# Patient Record
Sex: FEMALE | Race: WHITE | NOT HISPANIC OR LATINO
[De-identification: names, ages, dates, MRNs, and addresses within clinical notes are randomized per-mention and may not be internally consistent; named-entity substitution may affect disease eponyms.]

---

## 2021-02-08 ENCOUNTER — APPOINTMENT (OUTPATIENT)
Dept: ENDOCRINOLOGY | Facility: CLINIC | Age: 66
End: 2021-02-08
Payer: MEDICARE

## 2021-02-08 VITALS
DIASTOLIC BLOOD PRESSURE: 74 MMHG | WEIGHT: 176 LBS | TEMPERATURE: 98.2 F | OXYGEN SATURATION: 99 % | SYSTOLIC BLOOD PRESSURE: 140 MMHG | HEART RATE: 71 BPM | BODY MASS INDEX: 32.8 KG/M2 | HEIGHT: 61.42 IN | RESPIRATION RATE: 16 BRPM

## 2021-02-08 DIAGNOSIS — Z00.00 ENCOUNTER FOR GENERAL ADULT MEDICAL EXAMINATION W/OUT ABNORMAL FINDINGS: ICD-10-CM

## 2021-02-08 LAB — GLUCOSE BLDC GLUCOMTR-MCNC: 210

## 2021-02-08 PROCEDURE — 36415 COLL VENOUS BLD VENIPUNCTURE: CPT

## 2021-02-08 PROCEDURE — 95250 CONT GLUC MNTR PHYS/QHP EQP: CPT

## 2021-02-08 PROCEDURE — 95251 CONT GLUC MNTR ANALYSIS I&R: CPT

## 2021-02-08 PROCEDURE — 99072 ADDL SUPL MATRL&STAF TM PHE: CPT

## 2021-02-08 PROCEDURE — 99205 OFFICE O/P NEW HI 60 MIN: CPT | Mod: 25

## 2021-02-08 RX ORDER — ESCITALOPRAM OXALATE 10 MG/1
10 TABLET, FILM COATED ORAL
Refills: 0 | Status: ACTIVE | COMMUNITY

## 2021-02-08 RX ORDER — OMEPRAZOLE 40 MG/1
40 CAPSULE, DELAYED RELEASE ORAL
Qty: 30 | Refills: 0 | Status: ACTIVE | COMMUNITY
Start: 2021-01-04

## 2021-02-08 NOTE — HISTORY OF PRESENT ILLNESS
[FreeTextEntry1] : HISTORY OF PRESENT ILLNESS. \par \par Ms. FELICIANO was diagnosed with Diabetes Mellitus Type 2 in 2016. She reports history HTN, dyslipidemia, hyperthyroidism, MNG and denies CAD,  known complications of retinopathy, nephropathy, or neuropathy. She is presently on Metformin  mg w/ dinner, Lipitor 10 mg (1/2 tab qd), Acard 75 mg (equivalent to Plavix), Tapazole 2.5 mg 5 days a week, Indapen Dr 1.5 mg qd, Lexapro 10 mg. She is an immigrant from [place], residing in the area exposed to [place] fallout. She recalls having an uptake and scan showing "cold nodules" and an FNA done about 10 years ago, which was presumably benign. \par Blood sugars are checked 4 times a day. \par Log: fbs-105-146, p/B- , ac L- , p/D- , HS-  \par Hypoglycemia frequency: none\par Fingerstick glucose in the office today is 210 mg/dL 2 hours after eating. \par Diet: not following ADA\par Exercise: none\par \par Lab review: none\par \par Last dilated eye - 01/21\par Last podiatry visit  - none\par Last cardiology evaluation - several years ago\par Last stress test - does not recall\par Last 2-D Echo - several years ago\par Last nephrology evaluation - none\par Last neurology evaluation-none\par

## 2021-02-08 NOTE — ASSESSMENT
[FreeTextEntry1] : Current approaches to diabetes management are discussed with the patient. \par Target ranges for blood sugar, blood pressure and cholesterol reviewed, and risk reduction strategies verified. \par Hypoglycemia precautions reviewed with the patient. \par Suggested extensive diabetes education program, including nutritional and diabetes teaching and evaluation. \par Proper dietary restrictions and exercise routines discussed. \par Glucometer/SMBG and log book charting discussed.\par - Becky PRO\par - labs today\par - will likely switch to metformin/DPP4 combination post labs\par - continue lipitor, monitor blood pressure , urine microalbumin\par - advised to obtain prior pathology report from [facility]\par - repeat thyroid US and likely reFNA\par - given the size of the nodule, hyperthyroidism, higher risk for malignancy, would consider a surgical intervention\par - continue Tapazole 2.5 mg 5 days a week, pending labs\par RTC 3 months.\par \par *** [phone] (daughter Perla)\par *** [phone] (cell)

## 2021-02-15 ENCOUNTER — OUTPATIENT (OUTPATIENT)
Dept: OUTPATIENT SERVICES | Facility: HOSPITAL | Age: 66
LOS: 1 days | End: 2021-02-15
Payer: COMMERCIAL

## 2021-02-15 ENCOUNTER — APPOINTMENT (OUTPATIENT)
Dept: ULTRASOUND IMAGING | Facility: CLINIC | Age: 66
End: 2021-02-15
Payer: MEDICARE

## 2021-02-15 ENCOUNTER — RESULT REVIEW (OUTPATIENT)
Age: 66
End: 2021-02-15

## 2021-02-15 DIAGNOSIS — E78.5 HYPERLIPIDEMIA, UNSPECIFIED: ICD-10-CM

## 2021-02-15 DIAGNOSIS — I10 ESSENTIAL (PRIMARY) HYPERTENSION: ICD-10-CM

## 2021-02-15 PROCEDURE — 76536 US EXAM OF HEAD AND NECK: CPT | Mod: 26

## 2021-02-15 PROCEDURE — 76536 US EXAM OF HEAD AND NECK: CPT

## 2021-02-17 ENCOUNTER — NON-APPOINTMENT (OUTPATIENT)
Age: 66
End: 2021-02-17

## 2021-02-22 ENCOUNTER — APPOINTMENT (OUTPATIENT)
Dept: ENDOCRINOLOGY | Facility: CLINIC | Age: 66
End: 2021-02-22
Payer: MEDICARE

## 2021-02-22 PROCEDURE — 99072 ADDL SUPL MATRL&STAF TM PHE: CPT

## 2021-02-22 PROCEDURE — G0108 DIAB MANAGE TRN  PER INDIV: CPT

## 2021-04-19 ENCOUNTER — APPOINTMENT (OUTPATIENT)
Dept: SURGERY | Facility: CLINIC | Age: 66
End: 2021-04-19
Payer: MEDICARE

## 2021-04-28 ENCOUNTER — APPOINTMENT (OUTPATIENT)
Dept: SURGERY | Facility: CLINIC | Age: 66
End: 2021-04-28
Payer: MEDICARE

## 2021-04-28 VITALS
HEIGHT: 61.42 IN | HEART RATE: 71 BPM | DIASTOLIC BLOOD PRESSURE: 90 MMHG | WEIGHT: 170 LBS | SYSTOLIC BLOOD PRESSURE: 155 MMHG | BODY MASS INDEX: 31.69 KG/M2

## 2021-04-28 DIAGNOSIS — E05.90 THYROTOXICOSIS, UNSPECIFIED W/OUT THYROTOXIC CRISIS OR STORM: ICD-10-CM

## 2021-04-28 DIAGNOSIS — E04.2 NONTOXIC MULTINODULAR GOITER: ICD-10-CM

## 2021-04-28 PROCEDURE — 99204 OFFICE O/P NEW MOD 45 MIN: CPT

## 2021-04-28 PROCEDURE — 99072 ADDL SUPL MATRL&STAF TM PHE: CPT

## 2021-04-28 NOTE — REASON FOR VISIT
[Initial Consultation] : an initial consultation for [Other: _____] : [unfilled] [Source: ______] : History obtained from [unfilled] [FreeTextEntry2] : a toxic multinodular goiter

## 2021-04-28 NOTE — PHYSICAL EXAM
[Midline] : located in midline position [Normal] : orientation to person, place, and time: normal [de-identified] : Extremities: HAUSER x 4.   Skin: No obvious skin lesions.   Voice: clear

## 2021-04-29 LAB
T3 SERPL-MCNC: 102 NG/DL
T4 FREE SERPL-MCNC: 1.1 NG/DL
T4 SERPL-MCNC: 6.9 UG/DL
THYROGLOB AB SERPL-ACNC: 2315 IU/ML
THYROPEROXIDASE AB SERPL IA-ACNC: 385 IU/ML
TSH SERPL-ACNC: 0.45 UIU/ML

## 2021-05-01 LAB — TSI ACT/NOR SER: <0.1 IU/L

## 2021-05-03 LAB — TSH RECEPTOR AB: <1.1 IU/L

## 2021-05-04 ENCOUNTER — OUTPATIENT (OUTPATIENT)
Dept: OUTPATIENT SERVICES | Facility: HOSPITAL | Age: 66
LOS: 1 days | End: 2021-05-04
Payer: COMMERCIAL

## 2021-05-04 ENCOUNTER — APPOINTMENT (OUTPATIENT)
Dept: CT IMAGING | Facility: CLINIC | Age: 66
End: 2021-05-04
Payer: MEDICARE

## 2021-05-04 DIAGNOSIS — E05.20 THYROTOXICOSIS WITH TOXIC MULTINODULAR GOITER WITHOUT THYROTOXIC CRISIS OR STORM: ICD-10-CM

## 2021-05-04 PROCEDURE — 82565 ASSAY OF CREATININE: CPT

## 2021-05-04 PROCEDURE — 70491 CT SOFT TISSUE NECK W/DYE: CPT | Mod: 26

## 2021-05-04 PROCEDURE — 70491 CT SOFT TISSUE NECK W/DYE: CPT

## 2021-05-10 ENCOUNTER — NON-APPOINTMENT (OUTPATIENT)
Age: 66
End: 2021-05-10

## 2021-05-12 ENCOUNTER — APPOINTMENT (OUTPATIENT)
Dept: SURGERY | Facility: CLINIC | Age: 66
End: 2021-05-12
Payer: MEDICARE

## 2021-05-12 PROCEDURE — 99214 OFFICE O/P EST MOD 30 MIN: CPT

## 2021-05-12 PROCEDURE — 99072 ADDL SUPL MATRL&STAF TM PHE: CPT

## 2021-05-12 NOTE — PHYSICAL EXAM
[Midline] : located in midline position [Normal] : orientation to person, place, and time: normal [de-identified] : Extremities: HAUSER x 4.   Skin: No obvious skin lesions.   Voice: clear

## 2021-05-13 ENCOUNTER — APPOINTMENT (OUTPATIENT)
Dept: ENDOCRINOLOGY | Facility: CLINIC | Age: 66
End: 2021-05-13
Payer: MEDICARE

## 2021-05-13 VITALS
SYSTOLIC BLOOD PRESSURE: 140 MMHG | RESPIRATION RATE: 16 BRPM | BODY MASS INDEX: 30.75 KG/M2 | HEART RATE: 64 BPM | WEIGHT: 165 LBS | TEMPERATURE: 97.8 F | OXYGEN SATURATION: 97 % | DIASTOLIC BLOOD PRESSURE: 60 MMHG | HEIGHT: 61.42 IN

## 2021-05-13 LAB — GLUCOSE BLDC GLUCOMTR-MCNC: 124

## 2021-05-13 PROCEDURE — 36415 COLL VENOUS BLD VENIPUNCTURE: CPT

## 2021-05-13 PROCEDURE — 99072 ADDL SUPL MATRL&STAF TM PHE: CPT

## 2021-05-13 PROCEDURE — 99214 OFFICE O/P EST MOD 30 MIN: CPT | Mod: 25

## 2021-05-13 PROCEDURE — 82962 GLUCOSE BLOOD TEST: CPT

## 2021-05-13 RX ORDER — METFORMIN ER 500 MG 500 MG/1
500 TABLET ORAL DAILY
Qty: 180 | Refills: 3 | Status: ACTIVE | COMMUNITY
Start: 2021-02-22 | End: 1900-01-01

## 2021-05-13 NOTE — HISTORY OF PRESENT ILLNESS
[FreeTextEntry1] : F/u for multiple medical issues\par \par *** May 13, 2021 ***\par \par feels fine.\par  on Metformin  mg w/ dinner, Lipitor 10 mg (1/2 tab qd), Acard 75 mg (equivalent to Plavix), Tapazole 2.5 mg qd, Indapen Dr 1.5 mg qd, Lexapro 10 mg, Valsarta 160 mg qd\par \par CT neck (5/4/21) + mass effect on trachea with the shift to the left. + tracheal compression in AP dimension, but overall is patent. + Substernal extension\par Thyr US (2/15/21)-  large MNG with the dominant RLP 3.8x2.0x2.2 (no calcs)\par saw Dr. Lewis- planned for total tx. Scheduled to see a CTX sx\par \par HISTORY OF PRESENT ILLNESS. \par \par Ms. FELICIANO was diagnosed with Diabetes Mellitus Type 2 in 2016. She reports history HTN, dyslipidemia, hyperthyroidism, MNG and denies CAD,  known complications of retinopathy, nephropathy, or neuropathy. She is presently on Metformin  mg w/ dinner, Lipitor 10 mg (1/2 tab qd), Acard 75 mg (equivalent to Plavix), Tapazole 2.5 mg 5 days a week, Indapen Dr 1.5 mg qd, Lexapro 10 mg. She is an immigrant from [place], residing in the area exposed to [place] fallout. She recalls having an uptake and scan showing "cold nodules" and an FNA done about 10 years ago, which was presumably benign. \par Blood sugars are checked 4 times a day. \par Log: fbs-105-146, p/B- , ac L- , p/D- , HS-  \par Hypoglycemia frequency: none\par Fingerstick glucose in the office today is 210 mg/dL 2 hours after eating. \par Diet: not following ADA\par Exercise: none\par \par Lab review: none\par \par Last dilated eye - 01/21\par Last podiatry visit  - none\par Last cardiology evaluation - several years ago\par Last stress test - does not recall\par Last 2-D Echo - several years ago\par Last nephrology evaluation - none\par Last neurology evaluation-none\par

## 2021-05-13 NOTE — REASON FOR VISIT
[Follow - Up] : a follow-up visit [Hyperthyroidism] : hyperthyroidism [DM Type 2] : DM Type 2 [Thyroid nodule/ MNG] : thyroid nodule/ MNG

## 2021-05-13 NOTE — ASSESSMENT
[FreeTextEntry1] : - labs today\par - cont metformin, might need to switch to metformin/DPP4 combination\par - continue lipitor, monitor blood pressure , urine microalbumin\par - needs am PAC/PRA\par - advised to obtain prior pathology report from [facility]\par - pt is planning to go to [place] for few months, for total tx upon return\par - continue Tapazole 2.5 mg qd\par \par *** [phone] (daughter Perla)\par *** [phone] (cell)

## 2021-05-14 LAB
25(OH)D3 SERPL-MCNC: 27.2 NG/ML
ALBUMIN SERPL ELPH-MCNC: 4.6 G/DL
ALP BLD-CCNC: 69 U/L
ALT SERPL-CCNC: 16 U/L
ANION GAP SERPL CALC-SCNC: 9 MMOL/L
AST SERPL-CCNC: 16 U/L
BILIRUB SERPL-MCNC: 0.2 MG/DL
BUN SERPL-MCNC: 12 MG/DL
CALCIUM SERPL-MCNC: 9.5 MG/DL
CHLORIDE SERPL-SCNC: 102 MMOL/L
CHOLEST SERPL-MCNC: 171 MG/DL
CO2 SERPL-SCNC: 30 MMOL/L
CREAT SERPL-MCNC: 0.66 MG/DL
CREAT SPEC-SCNC: 64 MG/DL
ESTIMATED AVERAGE GLUCOSE: 120 MG/DL
FOLATE SERPL-MCNC: 5.6 NG/ML
FRUCTOSAMINE SERPL-MCNC: 244 UMOL/L
GLUCOSE SERPL-MCNC: 98 MG/DL
HBA1C MFR BLD HPLC: 5.8 %
HDLC SERPL-MCNC: 62 MG/DL
LDLC SERPL CALC-MCNC: 88 MG/DL
MICROALBUMIN 24H UR DL<=1MG/L-MCNC: <1.2 MG/DL
MICROALBUMIN/CREAT 24H UR-RTO: NORMAL MG/G
NONHDLC SERPL-MCNC: 109 MG/DL
POTASSIUM SERPL-SCNC: 4.5 MMOL/L
PROT SERPL-MCNC: 7 G/DL
SODIUM SERPL-SCNC: 141 MMOL/L
TRIGL SERPL-MCNC: 107 MG/DL
VIT B12 SERPL-MCNC: 1630 PG/ML

## 2021-05-17 ENCOUNTER — APPOINTMENT (OUTPATIENT)
Dept: THORACIC SURGERY | Facility: CLINIC | Age: 66
End: 2021-05-17

## 2021-05-20 ENCOUNTER — NON-APPOINTMENT (OUTPATIENT)
Age: 66
End: 2021-05-20

## 2021-12-06 ENCOUNTER — APPOINTMENT (OUTPATIENT)
Dept: ULTRASOUND IMAGING | Facility: IMAGING CENTER | Age: 66
End: 2021-12-06
Payer: MEDICARE

## 2021-12-06 ENCOUNTER — APPOINTMENT (OUTPATIENT)
Dept: SURGERY | Facility: CLINIC | Age: 66
End: 2021-12-06
Payer: MEDICARE

## 2021-12-06 ENCOUNTER — OUTPATIENT (OUTPATIENT)
Dept: OUTPATIENT SERVICES | Facility: HOSPITAL | Age: 66
LOS: 1 days | End: 2021-12-06
Payer: COMMERCIAL

## 2021-12-06 DIAGNOSIS — E04.9 NONTOXIC GOITER, UNSPECIFIED: ICD-10-CM

## 2021-12-06 PROCEDURE — 76536 US EXAM OF HEAD AND NECK: CPT | Mod: 26

## 2021-12-06 PROCEDURE — 99213 OFFICE O/P EST LOW 20 MIN: CPT

## 2021-12-06 PROCEDURE — 76536 US EXAM OF HEAD AND NECK: CPT

## 2021-12-06 NOTE — PHYSICAL EXAM
[Midline] : located in midline position [Normal] : orientation to person, place, and time: normal [de-identified] : Extremities: HAUSER x 4.   Skin: No obvious skin lesions.   Voice: clear

## 2021-12-13 ENCOUNTER — NON-APPOINTMENT (OUTPATIENT)
Age: 66
End: 2021-12-13

## 2021-12-13 ENCOUNTER — APPOINTMENT (OUTPATIENT)
Dept: THORACIC SURGERY | Facility: CLINIC | Age: 66
End: 2021-12-13
Payer: MEDICARE

## 2021-12-13 VITALS
BODY MASS INDEX: 32.87 KG/M2 | HEART RATE: 71 BPM | HEIGHT: 61.42 IN | SYSTOLIC BLOOD PRESSURE: 128 MMHG | DIASTOLIC BLOOD PRESSURE: 79 MMHG | WEIGHT: 176.37 LBS | RESPIRATION RATE: 17 BRPM | OXYGEN SATURATION: 95 %

## 2021-12-13 PROCEDURE — 99204 OFFICE O/P NEW MOD 45 MIN: CPT

## 2021-12-13 RX ORDER — VALSARTAN 160 MG/1
160 TABLET, COATED ORAL
Refills: 0 | Status: ACTIVE | COMMUNITY

## 2021-12-13 RX ORDER — POTASSIUM CHLORIDE 750 MG/1
10 CAPSULE, EXTENDED RELEASE ORAL DAILY
Qty: 30 | Refills: 2 | Status: DISCONTINUED | COMMUNITY
Start: 2021-02-22 | End: 2021-12-13

## 2021-12-16 NOTE — PHYSICAL EXAM
[General Appearance - Alert] : alert [General Appearance - In No Acute Distress] : in no acute distress [Sclera] : the sclera and conjunctiva were normal [PERRL With Normal Accommodation] : pupils were equal in size, round, and reactive to light [Extraocular Movements] : extraocular movements were intact [Outer Ear] : the ears and nose were normal in appearance [Oropharynx] : the oropharynx was normal [Neck Appearance] : the appearance of the neck was normal [Neck Cervical Mass (___cm)] : no neck mass was observed [Jugular Venous Distention Increased] : there was no jugular-venous distention [Thyroid Diffuse Enlargement] : the thyroid was not enlarged [Thyroid Nodule] : there were no palpable thyroid nodules [Auscultation Breath Sounds / Voice Sounds] : lungs were clear to auscultation bilaterally [Examination Of The Chest] : the chest was normal in appearance [Chest Visual Inspection Thoracic Asymmetry] : no chest asymmetry [Diminished Respiratory Excursion] : normal chest expansion [2+] : left 2+ [No Abnormalities] : the abdominal aorta was not enlarged and no bruit was heard [Breast Appearance] : normal in appearance [Breast Palpation Mass] : no palpable masses [Bowel Sounds] : normal bowel sounds [Abdomen Soft] : soft [Abdomen Tenderness] : non-tender [Abdomen Mass (___ Cm)] : no abdominal mass palpated [Cervical Lymph Nodes Enlarged Posterior Bilaterally] : posterior cervical [Cervical Lymph Nodes Enlarged Anterior Bilaterally] : anterior cervical [Supraclavicular Lymph Nodes Enlarged Bilaterally] : supraclavicular [No CVA Tenderness] : no ~M costovertebral angle tenderness [No Spinal Tenderness] : no spinal tenderness [Abnormal Walk] : normal gait [Nail Clubbing] : no clubbing  or cyanosis of the fingernails [Musculoskeletal - Swelling] : no joint swelling seen [Motor Tone] : muscle strength and tone were normal [Skin Color & Pigmentation] : normal skin color and pigmentation [Skin Turgor] : normal skin turgor [] : no rash [Deep Tendon Reflexes (DTR)] : deep tendon reflexes were 2+ and symmetric [Sensation] : the sensory exam was normal to light touch and pinprick [No Focal Deficits] : no focal deficits [Oriented To Time, Place, And Person] : oriented to person, place, and time [Impaired Insight] : insight and judgment were intact [Affect] : the affect was normal [Right Carotid Bruit] : no bruit heard over the right carotid [Left Carotid Bruit] : no bruit heard over the left carotid [Right Femoral Bruit] : no bruit heard over the right femoral artery [Left Femoral Bruit] : no bruit heard over the left femoral artery [FreeTextEntry1] : Deferred

## 2021-12-16 NOTE — CONSULT LETTER
[Dear  ___] : Dear  [unfilled], [Consult Letter:] : I had the pleasure of evaluating your patient, [unfilled]. [( Thank you for referring [unfilled] for consultation for _____ )] : Thank you for referring [unfilled] for consultation for [unfilled] [Please see my note below.] : Please see my note below. [Consult Closing:] : Thank you very much for allowing me to participate in the care of this patient.  If you have any questions, please do not hesitate to contact me. [Sincerely,] : Sincerely, [FreeTextEntry2] : Dr. Don Lewis (Head and Neck/Ref) [FreeTextEntry3] : Rick Crespo MD \par Attending Surgeon \par Division of Thoracic Surgery \par , Cardiovascular and Thoracic Surgery \par [facility]\par \par

## 2021-12-16 NOTE — ASSESSMENT
[FreeTextEntry1] : Ms. VIRAL FELICIANO, 66 year old female, never smoker, w/ hx of HTN, HLD, DM, Hashimoto's thyroiditis, who referred by Dr. 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 \par \par I have reviewed the patient's medical records and diagnostic images at time of this office consultation and have made the following recommendation:\par 1. CT neck reviewed and explained to patient, I recommended a partial sternotomy for a standby surgery. Risks and benefits and alternatives explained to patient, all questions answered, patient agreed to proceed with surgery.\par 2. F/u with Dr. Don Lewis. \par \par I personally performed the services described in the documentation, reviewed the documentation recorded by the scribe in my presence and it accurately and completely records my words and actions.\par \par I, Sharita Musa NP, am scribing for and the presence of DEIDRA Peraza, the following sections HISTORY OF PRESENT ILLNESS, PAST MEDICAL/FAMILY/SOCIAL HISTORY; REVIEW OF SYSTEMS; VITAL SIGNS; PHYSICAL EXAM; DISPOSITION. \par

## 2021-12-16 NOTE — HISTORY OF PRESENT ILLNESS
[FreeTextEntry1] : Ms. VIRAL FELICIANO, 66 year old female, never smoker, w/ hx of HTN, HLD, DM, Hashimoto's thyroiditis, who referred by Dr. 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 \par \par CT neck soft tissue on 05/04/2021:\par - Global heterogeneous enlargement of the thyroid gland is again seen, right side worse than left, containing multiple heterogeneous nodules, some of which are calcified. The dominant nodule is seen on the right posteriorly and measures up to 5.1 cm in greatest long axis dimensions.\par - The right thyroid lobe measures 4.9 x 6.3 x 9.0 cm (AP x TRV x CC). The left thyroid lobe measures 4.6 x 2.9 x 9.2 cm (AP x TRV x CC). Enlargement of the thyroid isthmus is also appreciated. Mass effect upon the trachea is seen which is minimally shifted to the left. The trachea is also compressed in AP dimensions but remains patent. Substernal extension of thyroid tissue is seen on the left adjacent to the trachea. There is no evidence of vocal cord paralysis.\par \par Patient is here today for CT surgery consultation for a standby surgery. Patient denies shortness of breath, cough, chest pain, fever, chills, loss of appetite, weight loss, or hemoptysis.

## 2021-12-20 ENCOUNTER — APPOINTMENT (OUTPATIENT)
Dept: SURGERY | Facility: CLINIC | Age: 66
End: 2021-12-20

## 2022-02-04 ENCOUNTER — OUTPATIENT (OUTPATIENT)
Dept: OUTPATIENT SERVICES | Facility: HOSPITAL | Age: 67
LOS: 1 days | End: 2022-02-04

## 2022-02-04 VITALS
TEMPERATURE: 98 F | HEART RATE: 77 BPM | HEIGHT: 62 IN | RESPIRATION RATE: 17 BRPM | WEIGHT: 173.94 LBS | DIASTOLIC BLOOD PRESSURE: 88 MMHG | OXYGEN SATURATION: 99 % | SYSTOLIC BLOOD PRESSURE: 145 MMHG

## 2022-02-04 DIAGNOSIS — Z90.710 ACQUIRED ABSENCE OF BOTH CERVIX AND UTERUS: Chronic | ICD-10-CM

## 2022-02-04 DIAGNOSIS — E11.9 TYPE 2 DIABETES MELLITUS WITHOUT COMPLICATIONS: ICD-10-CM

## 2022-02-04 DIAGNOSIS — E04.9 NONTOXIC GOITER, UNSPECIFIED: ICD-10-CM

## 2022-02-04 DIAGNOSIS — F32.89 OTHER SPECIFIED DEPRESSIVE EPISODES: ICD-10-CM

## 2022-02-04 DIAGNOSIS — E78.5 HYPERLIPIDEMIA, UNSPECIFIED: ICD-10-CM

## 2022-02-04 DIAGNOSIS — Z90.49 ACQUIRED ABSENCE OF OTHER SPECIFIED PARTS OF DIGESTIVE TRACT: Chronic | ICD-10-CM

## 2022-02-04 DIAGNOSIS — Z98.890 OTHER SPECIFIED POSTPROCEDURAL STATES: Chronic | ICD-10-CM

## 2022-02-04 DIAGNOSIS — E05.90 THYROTOXICOSIS, UNSPECIFIED WITHOUT THYROTOXIC CRISIS OR STORM: ICD-10-CM

## 2022-02-04 DIAGNOSIS — I10 ESSENTIAL (PRIMARY) HYPERTENSION: ICD-10-CM

## 2022-02-04 DIAGNOSIS — Z98.49 CATARACT EXTRACTION STATUS, UNSPECIFIED EYE: Chronic | ICD-10-CM

## 2022-02-04 LAB
A1C WITH ESTIMATED AVERAGE GLUCOSE RESULT: 6.1 % — HIGH (ref 4–5.6)
ANION GAP SERPL CALC-SCNC: 10 MMOL/L — SIGNIFICANT CHANGE UP (ref 7–14)
BLD GP AB SCN SERPL QL: NEGATIVE — SIGNIFICANT CHANGE UP
BUN SERPL-MCNC: 13 MG/DL — SIGNIFICANT CHANGE UP (ref 7–23)
CALCIUM SERPL-MCNC: 9.2 MG/DL — SIGNIFICANT CHANGE UP (ref 8.4–10.5)
CHLORIDE SERPL-SCNC: 102 MMOL/L — SIGNIFICANT CHANGE UP (ref 98–107)
CO2 SERPL-SCNC: 29 MMOL/L — SIGNIFICANT CHANGE UP (ref 22–31)
CREAT SERPL-MCNC: 0.62 MG/DL — SIGNIFICANT CHANGE UP (ref 0.5–1.3)
ESTIMATED AVERAGE GLUCOSE: 128 — SIGNIFICANT CHANGE UP
GLUCOSE SERPL-MCNC: 153 MG/DL — HIGH (ref 70–99)
HCT VFR BLD CALC: 42.6 % — SIGNIFICANT CHANGE UP (ref 34.5–45)
HGB BLD-MCNC: 13.5 G/DL — SIGNIFICANT CHANGE UP (ref 11.5–15.5)
MCHC RBC-ENTMCNC: 30.3 PG — SIGNIFICANT CHANGE UP (ref 27–34)
MCHC RBC-ENTMCNC: 31.7 GM/DL — LOW (ref 32–36)
MCV RBC AUTO: 95.5 FL — SIGNIFICANT CHANGE UP (ref 80–100)
NRBC # BLD: 0 /100 WBCS — SIGNIFICANT CHANGE UP
NRBC # FLD: 0 K/UL — SIGNIFICANT CHANGE UP
PLATELET # BLD AUTO: 183 K/UL — SIGNIFICANT CHANGE UP (ref 150–400)
POTASSIUM SERPL-MCNC: 4.1 MMOL/L — SIGNIFICANT CHANGE UP (ref 3.5–5.3)
POTASSIUM SERPL-SCNC: 4.1 MMOL/L — SIGNIFICANT CHANGE UP (ref 3.5–5.3)
RBC # BLD: 4.46 M/UL — SIGNIFICANT CHANGE UP (ref 3.8–5.2)
RBC # FLD: 12.8 % — SIGNIFICANT CHANGE UP (ref 10.3–14.5)
RH IG SCN BLD-IMP: NEGATIVE — SIGNIFICANT CHANGE UP
SODIUM SERPL-SCNC: 141 MMOL/L — SIGNIFICANT CHANGE UP (ref 135–145)
WBC # BLD: 3.97 K/UL — SIGNIFICANT CHANGE UP (ref 3.8–10.5)
WBC # FLD AUTO: 3.97 K/UL — SIGNIFICANT CHANGE UP (ref 3.8–10.5)

## 2022-02-04 NOTE — H&P PST ADULT - NSANTHOSAYNRD_GEN_A_CORE
No. MONIKA screening performed.  STOP BANG Legend: 0-2 = LOW Risk; 3-4 = INTERMEDIATE Risk; 5-8 = HIGH Risk

## 2022-02-04 NOTE — H&P PST ADULT - NS PRO FEM PAP NOT DONE 3 YRS
Tone is normal, moving all extremities well, reflexes normal for age.
patient agrees to have a pap smear

## 2022-02-04 NOTE — H&P PST ADULT - PROBLEM SELECTOR PLAN 1
pt scheduled for total thyroidectomy with resection of substernal goiter, possible central neck dissection on 02/16/22  Preop instructions provided. Pt verbalized understanding.   Pepcid for GI prophylaxis with written and verbal instruction provided    written and verbal instructions with teach back on chlorhexidine shampoo provided,  pt verbalized understanding with returned demonstration   med eval scheduled on 02/11/22, multiple comorbidities- copy requested pt scheduled for total thyroidectomy with resection of substernal goiter, possible central neck dissection on 02/16/22  Preop instructions provided. Pt verbalized understanding.   Pepcid for GI prophylaxis with written and verbal instruction provided    written and verbal instructions with teach back on chlorhexidine shampoo provided,  pt verbalized understanding with returned demonstration   med eval scheduled on 02/11/22, multiple comorbidities- copy requested  pt confirmed has appt for COVID Test 72 hrs preop

## 2022-02-04 NOTE — H&P PST ADULT - HISTORY OF PRESENT ILLNESS
66 y.o. female with h/o hyperthyroidism on methimazole, goiter "many years", s/p thyroid ultrasound in December 2021, reports "increased in size", c/o intermittent hoarseness, fatigue, denies dysphagia, weight loss, presents to PST with preop diagnosis of nontoxic goiter unspecified, scheduled for total thyroidectomy with resection of substernal goiter, possible central neck dissection

## 2022-02-04 NOTE — H&P PST ADULT - NSICDXPASTSURGICALHX_GEN_ALL_CORE_FT
PAST SURGICAL HISTORY:  H/O cataract extraction right eye    History of appendectomy     History of hysterectomy     S/P hernia repair umbilical

## 2022-02-04 NOTE — H&P PST ADULT - NECK DETAILS
nontoxic goiter, limited ROM due to pain/supple/no JVD nontoxic goiter, limited ROM with neck extension and lateral rotation due to pain/supple/no JVD goiter, limited ROM with neck extension and lateral rotation due to pain/supple/no JVD

## 2022-02-04 NOTE — H&P PST ADULT - NSICDXPASTMEDICALHX_GEN_ALL_CORE_FT
PAST MEDICAL HISTORY:  DM (diabetes mellitus)     HLD (hyperlipidemia)     HTN (hypertension)     Hyperthyroidism     Nontoxic goiter, unspecified      PAST MEDICAL HISTORY:  DM (diabetes mellitus)     H/O: depression     HLD (hyperlipidemia)     HTN (hypertension)     Hyperthyroidism     Nontoxic goiter, unspecified

## 2022-02-10 PROBLEM — E11.9 TYPE 2 DIABETES MELLITUS WITHOUT COMPLICATIONS: Chronic | Status: ACTIVE | Noted: 2022-02-04

## 2022-02-10 PROBLEM — I10 ESSENTIAL (PRIMARY) HYPERTENSION: Chronic | Status: ACTIVE | Noted: 2022-02-04

## 2022-02-10 PROBLEM — E78.5 HYPERLIPIDEMIA, UNSPECIFIED: Chronic | Status: ACTIVE | Noted: 2022-02-04

## 2022-02-10 PROBLEM — E05.90 THYROTOXICOSIS, UNSPECIFIED WITHOUT THYROTOXIC CRISIS OR STORM: Chronic | Status: ACTIVE | Noted: 2022-02-04

## 2022-02-10 PROBLEM — E04.9 NONTOXIC GOITER, UNSPECIFIED: Chronic | Status: ACTIVE | Noted: 2022-02-04

## 2022-02-10 PROBLEM — Z86.59 PERSONAL HISTORY OF OTHER MENTAL AND BEHAVIORAL DISORDERS: Chronic | Status: ACTIVE | Noted: 2022-02-04

## 2022-02-15 ENCOUNTER — TRANSCRIPTION ENCOUNTER (OUTPATIENT)
Age: 67
End: 2022-02-15

## 2022-02-15 NOTE — ASU PATIENT PROFILE, ADULT - NSICDXPASTMEDICALHX_GEN_ALL_CORE_FT
PAST MEDICAL HISTORY:  DM (diabetes mellitus)     H/O: depression     HLD (hyperlipidemia)     HTN (hypertension)     Hyperthyroidism     Nontoxic goiter, unspecified

## 2022-02-15 NOTE — ASU PATIENT PROFILE, ADULT - FALL HARM RISK - UNIVERSAL INTERVENTIONS
Bed in lowest position, wheels locked, appropriate side rails in place/Call bell, personal items and telephone in reach/Instruct patient to call for assistance before getting out of bed or chair/Non-slip footwear when patient is out of bed/[place] to call system/Physically safe environment - no spills, clutter or unnecessary equipment/Purposeful Proactive Rounding/Room/bathroom lighting operational, light cord in reach

## 2022-02-16 ENCOUNTER — RESULT REVIEW (OUTPATIENT)
Age: 67
End: 2022-02-16

## 2022-02-16 ENCOUNTER — INPATIENT (INPATIENT)
Facility: HOSPITAL | Age: 67
LOS: 0 days | Discharge: HOME | End: 2022-02-17
Attending: SURGERY | Admitting: SURGERY
Payer: MEDICARE

## 2022-02-16 ENCOUNTER — APPOINTMENT (OUTPATIENT)
Dept: THORACIC SURGERY | Facility: HOSPITAL | Age: 67
End: 2022-02-16
Payer: MEDICARE

## 2022-02-16 ENCOUNTER — APPOINTMENT (OUTPATIENT)
Dept: SURGERY | Facility: HOSPITAL | Age: 67
End: 2022-02-16

## 2022-02-16 VITALS
SYSTOLIC BLOOD PRESSURE: 171 MMHG | OXYGEN SATURATION: 97 % | TEMPERATURE: 99 F | RESPIRATION RATE: 16 BRPM | HEART RATE: 85 BPM | DIASTOLIC BLOOD PRESSURE: 81 MMHG | WEIGHT: 173.94 LBS | HEIGHT: 62 IN

## 2022-02-16 DIAGNOSIS — Z90.49 ACQUIRED ABSENCE OF OTHER SPECIFIED PARTS OF DIGESTIVE TRACT: Chronic | ICD-10-CM

## 2022-02-16 DIAGNOSIS — Z98.49 CATARACT EXTRACTION STATUS, UNSPECIFIED EYE: Chronic | ICD-10-CM

## 2022-02-16 DIAGNOSIS — E04.9 NONTOXIC GOITER, UNSPECIFIED: ICD-10-CM

## 2022-02-16 DIAGNOSIS — E05.20 THYROTOXICOSIS WITH TOXIC MULTINODULAR GOITER W/OUT THYROTOXIC CRISIS OR STORM: ICD-10-CM

## 2022-02-16 DIAGNOSIS — Z90.710 ACQUIRED ABSENCE OF BOTH CERVIX AND UTERUS: Chronic | ICD-10-CM

## 2022-02-16 DIAGNOSIS — Z86.39 PERSONAL HISTORY OF OTHER ENDOCRINE, NUTRITIONAL AND METABOLIC DISEASE: ICD-10-CM

## 2022-02-16 DIAGNOSIS — Z98.890 OTHER SPECIFIED POSTPROCEDURAL STATES: Chronic | ICD-10-CM

## 2022-02-16 DIAGNOSIS — Z86.79 PERSONAL HISTORY OF OTHER DISEASES OF THE CIRCULATORY SYSTEM: ICD-10-CM

## 2022-02-16 PROCEDURE — 88331 PATH CONSLTJ SURG 1 BLK 1SPC: CPT | Mod: 26

## 2022-02-16 PROCEDURE — 88311 DECALCIFY TISSUE: CPT | Mod: 26

## 2022-02-16 PROCEDURE — 88334 PATH CONSLTJ SURG CYTO XM EA: CPT | Mod: 26,59

## 2022-02-16 PROCEDURE — 88307 TISSUE EXAM BY PATHOLOGIST: CPT | Mod: 26

## 2022-02-16 PROCEDURE — 88305 TISSUE EXAM BY PATHOLOGIST: CPT | Mod: 26

## 2022-02-16 DEVICE — LIGATING CLIPS WECK HORIZON MEDIUM (BLUE) 24: Type: IMPLANTABLE DEVICE | Status: FUNCTIONAL

## 2022-02-16 DEVICE — LIGATING CLIPS WECK HORIZON SMALL-WIDE (RED) 24: Type: IMPLANTABLE DEVICE | Status: FUNCTIONAL

## 2022-02-16 DEVICE — SURGICEL 2 X 14": Type: IMPLANTABLE DEVICE | Status: FUNCTIONAL

## 2022-02-16 RX ORDER — HYDROMORPHONE HYDROCHLORIDE 2 MG/ML
0.3 INJECTION INTRAMUSCULAR; INTRAVENOUS; SUBCUTANEOUS
Refills: 0 | Status: DISCONTINUED | OUTPATIENT
Start: 2022-02-16 | End: 2022-02-16

## 2022-02-16 RX ORDER — SODIUM CHLORIDE 9 MG/ML
1000 INJECTION, SOLUTION INTRAVENOUS
Refills: 0 | Status: ACTIVE | OUTPATIENT
Start: 2022-02-16 | End: 2023-01-15

## 2022-02-16 RX ORDER — ATORVASTATIN CALCIUM 80 MG/1
10 TABLET, FILM COATED ORAL AT BEDTIME
Refills: 0 | Status: ACTIVE | OUTPATIENT
Start: 2022-02-16 | End: 2023-01-15

## 2022-02-16 RX ORDER — GLUCAGON INJECTION, SOLUTION 0.5 MG/.1ML
1 INJECTION, SOLUTION SUBCUTANEOUS ONCE
Refills: 0 | Status: ACTIVE | OUTPATIENT
Start: 2022-02-16 | End: 2023-01-15

## 2022-02-16 RX ORDER — METHIMAZOLE 10 MG/1
0.5 TABLET ORAL
Qty: 0 | Refills: 0 | DISCHARGE

## 2022-02-16 RX ORDER — ONDANSETRON 8 MG/1
4 TABLET, FILM COATED ORAL EVERY 6 HOURS
Refills: 0 | Status: DISCONTINUED | OUTPATIENT
Start: 2022-02-16 | End: 2022-02-16

## 2022-02-16 RX ORDER — METFORMIN HYDROCHLORIDE 850 MG/1
1 TABLET ORAL
Qty: 0 | Refills: 0 | DISCHARGE

## 2022-02-16 RX ORDER — DEXTROSE 50 % IN WATER 50 %
15 SYRINGE (ML) INTRAVENOUS ONCE
Refills: 0 | Status: ACTIVE | OUTPATIENT
Start: 2022-02-16 | End: 2023-01-15

## 2022-02-16 RX ORDER — ESCITALOPRAM OXALATE 10 MG/1
15 TABLET, FILM COATED ORAL DAILY
Refills: 0 | Status: ACTIVE | OUTPATIENT
Start: 2022-02-16 | End: 2023-01-15

## 2022-02-16 RX ORDER — DEXTROSE 50 % IN WATER 50 %
25 SYRINGE (ML) INTRAVENOUS ONCE
Refills: 0 | Status: ACTIVE | OUTPATIENT
Start: 2022-02-16

## 2022-02-16 RX ORDER — INSULIN LISPRO 100/ML
VIAL (ML) SUBCUTANEOUS AT BEDTIME
Refills: 0 | Status: ACTIVE | OUTPATIENT
Start: 2022-02-16 | End: 2023-01-15

## 2022-02-16 RX ORDER — HYDROMORPHONE HYDROCHLORIDE 2 MG/ML
0.5 INJECTION INTRAMUSCULAR; INTRAVENOUS; SUBCUTANEOUS
Refills: 0 | Status: DISCONTINUED | OUTPATIENT
Start: 2022-02-16 | End: 2022-02-16

## 2022-02-16 RX ORDER — OXYCODONE HYDROCHLORIDE 5 MG/1
5 TABLET ORAL EVERY 4 HOURS
Refills: 0 | Status: ACTIVE | OUTPATIENT
Start: 2022-02-16 | End: 2022-02-23

## 2022-02-16 RX ORDER — VALSARTAN 80 MG/1
1 TABLET ORAL
Qty: 0 | Refills: 0 | DISCHARGE

## 2022-02-16 RX ORDER — HYDROMORPHONE HYDROCHLORIDE 2 MG/ML
1 INJECTION INTRAMUSCULAR; INTRAVENOUS; SUBCUTANEOUS
Refills: 0 | Status: DISCONTINUED | OUTPATIENT
Start: 2022-02-16 | End: 2022-02-16

## 2022-02-16 RX ORDER — CALCIUM CARBONATE 500(1250)
2 TABLET ORAL EVERY 8 HOURS
Refills: 0 | Status: ACTIVE | OUTPATIENT
Start: 2022-02-16 | End: 2023-01-15

## 2022-02-16 RX ORDER — INSULIN LISPRO 100/ML
VIAL (ML) SUBCUTANEOUS
Refills: 0 | Status: ACTIVE | OUTPATIENT
Start: 2022-02-16 | End: 2023-01-15

## 2022-02-16 RX ORDER — ACETAMINOPHEN 500 MG
650 TABLET ORAL EVERY 6 HOURS
Refills: 0 | Status: ACTIVE | OUTPATIENT
Start: 2022-02-16 | End: 2023-01-15

## 2022-02-16 RX ORDER — SODIUM CHLORIDE 9 MG/ML
1000 INJECTION, SOLUTION INTRAVENOUS
Refills: 0 | Status: DISCONTINUED | OUTPATIENT
Start: 2022-02-16 | End: 2022-02-16

## 2022-02-16 RX ORDER — ESCITALOPRAM OXALATE 10 MG/1
1.5 TABLET, FILM COATED ORAL
Qty: 0 | Refills: 0 | DISCHARGE

## 2022-02-16 RX ORDER — OXYCODONE HYDROCHLORIDE 5 MG/1
2.5 TABLET ORAL EVERY 4 HOURS
Refills: 0 | Status: ACTIVE | OUTPATIENT
Start: 2022-02-16 | End: 2022-02-23

## 2022-02-16 RX ORDER — SODIUM CHLORIDE 9 MG/ML
1000 INJECTION, SOLUTION INTRAVENOUS
Refills: 0 | Status: DISCONTINUED | OUTPATIENT
Start: 2022-02-16 | End: 2022-02-17

## 2022-02-16 RX ORDER — ATORVASTATIN CALCIUM 80 MG/1
1 TABLET, FILM COATED ORAL
Qty: 0 | Refills: 0 | DISCHARGE

## 2022-02-16 RX ORDER — LEVOTHYROXINE SODIUM 125 MCG
125 TABLET ORAL DAILY
Refills: 0 | Status: ACTIVE | OUTPATIENT
Start: 2022-02-16 | End: 2023-01-15

## 2022-02-16 RX ORDER — DEXTROSE 50 % IN WATER 50 %
12.5 SYRINGE (ML) INTRAVENOUS ONCE
Refills: 0 | Status: ACTIVE | OUTPATIENT
Start: 2022-02-16

## 2022-02-16 RX ORDER — ONDANSETRON 8 MG/1
4 TABLET, FILM COATED ORAL ONCE
Refills: 0 | Status: DISCONTINUED | OUTPATIENT
Start: 2022-02-16 | End: 2022-02-16

## 2022-02-16 RX ORDER — VALSARTAN 80 MG/1
160 TABLET ORAL DAILY
Refills: 0 | Status: ACTIVE | OUTPATIENT
Start: 2022-02-16 | End: 2023-01-15

## 2022-02-16 RX ORDER — ATORVASTATIN CALCIUM 80 MG/1
10 TABLET, FILM COATED ORAL DAILY
Refills: 0 | Status: DISCONTINUED | OUTPATIENT
Start: 2022-02-16 | End: 2022-02-16

## 2022-02-16 RX ADMIN — ATORVASTATIN CALCIUM 10 MILLIGRAM(S): 80 TABLET, FILM COATED ORAL at 22:47

## 2022-02-16 RX ADMIN — Medication 2 TABLET(S): at 22:48

## 2022-02-16 RX ADMIN — SODIUM CHLORIDE 50 MILLILITER(S): 9 INJECTION, SOLUTION INTRAVENOUS at 18:56

## 2022-02-16 RX ADMIN — Medication 1: at 17:35

## 2022-02-16 RX ADMIN — Medication 650 MILLIGRAM(S): at 22:47

## 2022-02-16 NOTE — PATIENT PROFILE ADULT - FALL HARM RISK - HARM RISK INTERVENTIONS

## 2022-02-16 NOTE — CHART NOTE - NSCHARTNOTEFT_GEN_A_CORE
POST-OP NOTE    VIRAL FELICIANO | [id number] | [id number] B    Procedure: s/p Total thyroidectomy + parathyroid reimplantation.     Subjective: Patient seen and examined at bedside. No complains. Patietn passed the TTV    Vital Signs Last 24 Hrs  T(C): 36.9 (16 Feb 2022 21:30), Max: 37.8 (16 Feb 2022 16:45)  T(F): 98.4 (16 Feb 2022 21:30), Max: 100 (16 Feb 2022 16:45)  HR: 85 (16 Feb 2022 21:30) (77 - 85)  BP: 138/78 (16 Feb 2022 21:30) (134/79 - 171/81)  BP(mean): 98 (16 Feb 2022 19:00) (93 - 136)  RR: 18 (16 Feb 2022 21:30) (12 - 20)  SpO2: 98% (16 Feb 2022 21:30) (95% - 98%)  I&O's Summary    16 Feb 2022 07:01  -  17 Feb 2022 02:15  --------------------------------------------------------  IN: 170 mL / OUT: 450 mL / NET: -280 mL      PHYSICAL EXAM:  Gen: NAD  Resp: breathing easily, no stridor  NECK: Non tender, no edema or achymosis noted. Covered by wide, clean, dry, intact dressing. Black drain in place.   CV: RRR  Abdomen: soft, nontender, nondistended  Skin: Incision c/d/i. Normal color, no rashes or lesions    PLAN:  - Discharge home  - Drain teaching  - NO DVT Prophylaxis,   - Diet: regular.   - Pain control, NO NSAIDS.         D team   [id number]

## 2022-02-16 NOTE — BRIEF OPERATIVE NOTE - SPECIMENS
Right thyroid with isthmus goiter, suture at superior pole. Left thyroid with suture at superior pole, L inferior parathyroid

## 2022-02-16 NOTE — BRIEF OPERATIVE NOTE - OPERATION/FINDINGS
10 cm neck incision made. Platysma and strap muscle flaps raised. Right and left thyroid removed. L inferior parathyroid reimplanted into L SCM. Right and left superior parathyroid visualized. Right and left thyroid sent for frozen section and resulted as adenomatous goiter. Right and left recurrent laryngeal nerve visualized and confirmed with nerve monitoring. Hemostasis achieved. 15Fr danny drain left in place. Strap muscles and platysma closed. Skin closed with subcuticular sutures,

## 2022-02-17 ENCOUNTER — TRANSCRIPTION ENCOUNTER (OUTPATIENT)
Age: 67
End: 2022-02-17

## 2022-02-17 VITALS
OXYGEN SATURATION: 96 % | HEART RATE: 68 BPM | TEMPERATURE: 98 F | RESPIRATION RATE: 18 BRPM | SYSTOLIC BLOOD PRESSURE: 147 MMHG | DIASTOLIC BLOOD PRESSURE: 77 MMHG

## 2022-02-17 RX ORDER — CALCIUM CARBONATE 500(1250)
2 TABLET ORAL
Qty: 180 | Refills: 0
Start: 2022-02-17 | End: 2022-03-18

## 2022-02-17 RX ORDER — LEVOTHYROXINE SODIUM 125 MCG
1 TABLET ORAL
Qty: 30 | Refills: 0
Start: 2022-02-17 | End: 2022-03-18

## 2022-02-17 RX ORDER — OXYCODONE HYDROCHLORIDE 5 MG/1
1 TABLET ORAL
Qty: 8 | Refills: 0
Start: 2022-02-17 | End: 2022-02-18

## 2022-02-17 RX ORDER — SODIUM CHLORIDE 9 MG/ML
3 INJECTION INTRAMUSCULAR; INTRAVENOUS; SUBCUTANEOUS EVERY 8 HOURS
Refills: 0 | Status: ACTIVE | OUTPATIENT
Start: 2022-02-17 | End: 2023-01-16

## 2022-02-17 RX ORDER — ACETAMINOPHEN 500 MG
2 TABLET ORAL
Qty: 0 | Refills: 0 | DISCHARGE
Start: 2022-02-17

## 2022-02-17 RX ADMIN — VALSARTAN 160 MILLIGRAM(S): 80 TABLET ORAL at 05:29

## 2022-02-17 RX ADMIN — Medication 650 MILLIGRAM(S): at 09:26

## 2022-02-17 RX ADMIN — Medication 1: at 12:54

## 2022-02-17 RX ADMIN — ESCITALOPRAM OXALATE 15 MILLIGRAM(S): 10 TABLET, FILM COATED ORAL at 12:54

## 2022-02-17 RX ADMIN — Medication 2 TABLET(S): at 07:12

## 2022-02-17 RX ADMIN — Medication 125 MICROGRAM(S): at 05:29

## 2022-02-17 NOTE — DISCHARGE NOTE PROVIDER - NSDCFUADDINST_GEN_ALL_CORE_FT
WOUND CARE:  Keep wound clean and dry. Do not scrub or rub incisions. Do not use lotion or powder on inscisions.  DRAIN: You will be discharged with a JUANCHO drain. Please empty and record the outputs daily. This will be taught to you by the nursing staff. If the color, quality, or quantity of the fluid changes, please call your surgeon's office. Please do not remove the JUANCHO drain, it will be removed in the office at your follow up visit.   BATHING: Please do not submerge wound underwater. You may shower and/or sponge bathe.  ACTIVITY: No heavy lifting or straining. Otherwise, you may return to your usual level of physical activity. If you are taking narcotic pain medication (such as Percocet) DO NOT drive a car, operate machinery or make important decisions.  DIET: Return to your usual diet.  NOTIFY YOUR SURGEON IF: You have any bleeding that does not stop, any pus draining from your wound(s), any fever (over 100.4 F) or chills, persistent nausea/vomiting, persistent diarrhea, or if your pain is not controlled on your discharge pain medications.  FOLLOW-UP: Please follow up with your primary care physician in one week regarding your hospitalization

## 2022-02-17 NOTE — DISCHARGE NOTE NURSING/CASE MANAGEMENT/SOCIAL WORK - NSDCPEFALRISK_GEN_ALL_CORE
For information on Fall & Injury Prevention, visit: [email or web address] OR  [email or web address] OR  https://www.cdc.gov/steadi/patient.html

## 2022-02-17 NOTE — PROGRESS NOTE ADULT - SUBJECTIVE AND OBJECTIVE BOX
TEAM D Surgery Progress Note    INTERVAL EVENTS:   No acute events overnight.    SUBJECTIVE: Patient seen and examined at bedside with surgical team    OBJECTIVE:    Vital Signs Last 24 Hrs  T(C): 36.9 (16 Feb 2022 21:30), Max: 37.8 (16 Feb 2022 16:45)  T(F): 98.4 (16 Feb 2022 21:30), Max: 100 (16 Feb 2022 16:45)  HR: 85 (16 Feb 2022 21:30) (77 - 85)  BP: 138/78 (16 Feb 2022 21:30) (134/79 - 171/81)  BP(mean): 98 (16 Feb 2022 19:00) (93 - 136)  RR: 18 (16 Feb 2022 21:30) (12 - 20)  SpO2: 98% (16 Feb 2022 21:30) (95% - 98%)I&O's Detail    16 Feb 2022 07:01  -  17 Feb 2022 02:22  --------------------------------------------------------  IN:    Lactated Ringers: 50 mL    Oral Fluid: 120 mL  Total IN: 170 mL    OUT:    Voided (mL): 450 mL  Total OUT: 450 mL    Total NET: -280 mL      MEDICATIONS  (STANDING):  acetaminophen     Tablet .. 650 milliGRAM(s) Oral every 6 hours  atorvastatin 10 milliGRAM(s) Oral at bedtime  calcium carbonate   1250 mG (OsCal) 2 Tablet(s) Oral every 8 hours  dextrose 40% Gel 15 Gram(s) Oral once  dextrose 5%. 1000 milliLiter(s) (50 mL/Hr) IV Continuous <Continuous>  dextrose 5%. 1000 milliLiter(s) (100 mL/Hr) IV Continuous <Continuous>  dextrose 50% Injectable 25 Gram(s) IV Push once  dextrose 50% Injectable 12.5 Gram(s) IV Push once  dextrose 50% Injectable 25 Gram(s) IV Push once  escitalopram 15 milliGRAM(s) Oral daily  glucagon  Injectable 1 milliGRAM(s) IntraMuscular once  insulin lispro (ADMELOG) corrective regimen sliding scale   SubCutaneous three times a day before meals  insulin lispro (ADMELOG) corrective regimen sliding scale   SubCutaneous at bedtime  lactated ringers. 1000 milliLiter(s) (50 mL/Hr) IV Continuous <Continuous>  levothyroxine 125 MICROGram(s) Oral daily  valsartan 160 milliGRAM(s) Oral daily    MEDICATIONS  (PRN):  oxyCODONE    IR 2.5 milliGRAM(s) Oral every 4 hours PRN Moderate Pain (4 - 6)  oxyCODONE    IR 5 milliGRAM(s) Oral every 4 hours PRN Severe Pain (7 - 10)      PHYSICAL EXAM:  Constitutional: A&Ox3, NAD  Respiratory: Unlabored breathing  NECK:   Abdomen: Soft, nondistended, NTTP. No rebound or guarding.  Extremities: WWP, HAUSER spontaneously    LABS:                ABO Interpretation: O (02-16-22 @ 10:58)      IMAGING:     TEAM D Surgery Progress Note    INTERVAL EVENTS:   No acute events overnight.    SUBJECTIVE: Patient seen and examined at bedside with surgical team. No respiratory distress noted.     OBJECTIVE:    Vital Signs Last 24 Hrs  T(C): 36.9 (16 Feb 2022 21:30), Max: 37.8 (16 Feb 2022 16:45)  T(F): 98.4 (16 Feb 2022 21:30), Max: 100 (16 Feb 2022 16:45)  HR: 85 (16 Feb 2022 21:30) (77 - 85)  BP: 138/78 (16 Feb 2022 21:30) (134/79 - 171/81)  BP(mean): 98 (16 Feb 2022 19:00) (93 - 136)  RR: 18 (16 Feb 2022 21:30) (12 - 20)  SpO2: 98% (16 Feb 2022 21:30) (95% - 98%)I&O's Detail    16 Feb 2022 07:01  -  17 Feb 2022 02:22  --------------------------------------------------------  IN:    Lactated Ringers: 50 mL    Oral Fluid: 120 mL  Total IN: 170 mL    OUT:    Voided (mL): 450 mL  Total OUT: 450 mL    Total NET: -280 mL      MEDICATIONS  (STANDING):  acetaminophen     Tablet .. 650 milliGRAM(s) Oral every 6 hours  atorvastatin 10 milliGRAM(s) Oral at bedtime  calcium carbonate   1250 mG (OsCal) 2 Tablet(s) Oral every 8 hours  dextrose 40% Gel 15 Gram(s) Oral once  dextrose 5%. 1000 milliLiter(s) (50 mL/Hr) IV Continuous <Continuous>  dextrose 5%. 1000 milliLiter(s) (100 mL/Hr) IV Continuous <Continuous>  dextrose 50% Injectable 25 Gram(s) IV Push once  dextrose 50% Injectable 12.5 Gram(s) IV Push once  dextrose 50% Injectable 25 Gram(s) IV Push once  escitalopram 15 milliGRAM(s) Oral daily  glucagon  Injectable 1 milliGRAM(s) IntraMuscular once  insulin lispro (ADMELOG) corrective regimen sliding scale   SubCutaneous three times a day before meals  insulin lispro (ADMELOG) corrective regimen sliding scale   SubCutaneous at bedtime  lactated ringers. 1000 milliLiter(s) (50 mL/Hr) IV Continuous <Continuous>  levothyroxine 125 MICROGram(s) Oral daily  valsartan 160 milliGRAM(s) Oral daily    MEDICATIONS  (PRN):  oxyCODONE    IR 2.5 milliGRAM(s) Oral every 4 hours PRN Moderate Pain (4 - 6)  oxyCODONE    IR 5 milliGRAM(s) Oral every 4 hours PRN Severe Pain (7 - 10)      PHYSICAL EXAM:  Constitutional: A&Ox3, NAD  Respiratory: Unlabored breathing  NECK: anterior neck incision c/d/i, JUANCHO drain x1 serosanguinous output  Abdomen: Soft, nondistended, NTTP. No rebound or guarding.  Extremities: WWP, HAUSER spontaneously    LABS:                ABO Interpretation: O (02-16-22 @ 10:58)      IMAGING:

## 2022-02-17 NOTE — PROGRESS NOTE ADULT - ASSESSMENT
66 years old female, s/p Total thyroidectomy + parathyroid reimplantation on 2/17.    PLAN:    - NO DVT Prophylaxis,   - Diet: regular.   - Pain control, NO NSAIDS.   - Discharge home  - Drain teaching      D team   [id number]. 66 years old female, s/p Total thyroidectomy + parathyroid reimplantation on 2/17.    PLAN:    - NO DVT Prophylaxis,   - Diet: regular.   - Pain control, NO NSAIDS.   - Discharge home  - Drain teaching  - Will start PO Levothyroxine 125 mcg and PO calcium carbonate 1250 mg 2 tablets q8 hours    D team   [id number].

## 2022-02-17 NOTE — DISCHARGE NOTE PROVIDER - CARE PROVIDER_API CALL
Don Lewis  General Surgery  [address] [place] [address]  Phone: [phone]  Fax: [phone]  Follow Up Time: 2 weeks

## 2022-02-17 NOTE — DISCHARGE NOTE PROVIDER - HOSPITAL COURSE
66F with history of hyperthyroidism on methimazole, goiter "many years", s/p thyroid ultrasound in December 2021, reports "increased in size", c/o intermittent hoarseness, fatigue, denies dysphagia, weight loss, presented to [facility] for scheduled total thyroidectomy for nontoxic goiter. Patient underwent total thyroidectomy, L inferior parathyroid reimplanted into L SCM. Right and left superior parathyroid visualized. 15Fr Baldev drain left in place.   Patient tolerated operation well and there were no post-operative complications identified. Patient remained hemodynamically stable in the PACU and transferred to the surgical floor. Diet was restarted and advanced as tolerated. Pain control was transitioned from IV to PO pain meds. At this time, patient is currently ambulating, voiding, tolerating a regular diet. Pain well controlled on PO pain meds. Patient felt safe with being discharged, and understood and agreed with plan. Per Dr. Lewis, patient is stable for discharge to home with VNS and outpatient follow up. 66F with history of hyperthyroidism on methimazole, goiter "many years", s/p thyroid ultrasound in December 2021, reports "increased in size", c/o intermittent hoarseness, fatigue, denies dysphagia, weight loss, presented to [facility] for scheduled total thyroidectomy for nontoxic goiter. Patient underwent total thyroidectomy, L inferior parathyroid reimplanted into L SCM. Right and left superior parathyroid visualized. 15Fr Baldev drain left in place.   Patient tolerated operation well and there were no post-operative complications identified. Patient remained hemodynamically stable in the PACU and transferred to the surgical floor. Diet was restarted and advanced as tolerated. Pain control was transitioned from IV to PO pain meds. At this time, patient is currently ambulating, voiding, tolerating a regular diet. Pain well controlled on PO pain meds. Patient felt safe with being discharged, and understood and agreed with plan. Per Dr. Lewis, patient is stable for discharge to home.

## 2022-02-17 NOTE — DISCHARGE NOTE PROVIDER - NSDCMRMEDTOKEN_GEN_ALL_CORE_FT
acetaminophen 325 mg oral tablet: 2 tab(s) orally every 6 hours  atorvastatin 10 mg oral tablet: 1 tab(s) orally once a day, PM  calcium carbonate 1250 mg (500 mg elemental calcium) oral tablet: 2 tab(s) orally every 8 hours  escitalopram 10 mg oral tablet: 1.5 tab(s) orally once a day, AM  levothyroxine 125 mcg (0.125 mg) oral tablet: 1 tab(s) orally once a day  metFORMIN 500 mg oral tablet, extended release: 1 tab(s) orally once a day, PM  oxyCODONE 5 mg oral tablet: 1 tab(s) orally every 6 hours for SEVERE pain only MDD:4  valsartan 160 mg oral tablet: 1 tab(s) orally once a day, AM

## 2022-02-17 NOTE — DISCHARGE NOTE PROVIDER - NSDCFUSCHEDAPPT_GEN_ALL_CORE_FT
VIRAL FELICIANO ; 02/23/2022 ; ALEYDA [place] [address] [facility]  VIRAL FELICIANO ; 02/23/2022 ; ALEYDA [facility] [address] [facility]

## 2022-02-17 NOTE — DISCHARGE NOTE NURSING/CASE MANAGEMENT/SOCIAL WORK - PATIENT PORTAL LINK FT
You can access the FollowMyHealth Patient Portal offered by [facility] by registering at the following website: [email or web address]. By joining [facility]’s FollowMyHealth portal, you will also be able to view your health information using other applications (apps) compatible with our system.

## 2022-02-22 ENCOUNTER — NON-APPOINTMENT (OUTPATIENT)
Age: 67
End: 2022-02-22

## 2022-02-23 ENCOUNTER — NON-APPOINTMENT (OUTPATIENT)
Age: 67
End: 2022-02-23

## 2022-02-23 ENCOUNTER — APPOINTMENT (OUTPATIENT)
Dept: SURGERY | Facility: CLINIC | Age: 67
End: 2022-02-23
Payer: MEDICARE

## 2022-02-23 ENCOUNTER — APPOINTMENT (OUTPATIENT)
Dept: ENDOCRINOLOGY | Facility: CLINIC | Age: 67
End: 2022-02-23
Payer: MEDICARE

## 2022-02-23 VITALS
RESPIRATION RATE: 16 BRPM | BODY MASS INDEX: 32.24 KG/M2 | OXYGEN SATURATION: 97 % | HEIGHT: 61.42 IN | WEIGHT: 173 LBS | TEMPERATURE: 98.5 F | HEART RATE: 78 BPM | SYSTOLIC BLOOD PRESSURE: 120 MMHG | DIASTOLIC BLOOD PRESSURE: 60 MMHG

## 2022-02-23 DIAGNOSIS — Z09 ENCOUNTER FOR FOLLOW-UP EXAMINATION AFTER COMPLETED TREATMENT FOR CONDITIONS OTHER THAN MALIGNANT NEOPLASM: ICD-10-CM

## 2022-02-23 DIAGNOSIS — E11.65 TYPE 2 DIABETES MELLITUS WITH HYPERGLYCEMIA: ICD-10-CM

## 2022-02-23 LAB — GLUCOSE BLDC GLUCOMTR-MCNC: 110

## 2022-02-23 PROCEDURE — 82962 GLUCOSE BLOOD TEST: CPT

## 2022-02-23 PROCEDURE — 99214 OFFICE O/P EST MOD 30 MIN: CPT | Mod: 25

## 2022-02-23 PROCEDURE — 36415 COLL VENOUS BLD VENIPUNCTURE: CPT

## 2022-02-23 PROCEDURE — 99024 POSTOP FOLLOW-UP VISIT: CPT

## 2022-02-23 RX ORDER — METHIMAZOLE 5 MG/1
5 TABLET ORAL
Qty: 90 | Refills: 3 | Status: DISCONTINUED | COMMUNITY
Start: 2021-02-22 | End: 2022-02-23

## 2022-02-23 RX ORDER — OXYCODONE 5 MG/1
5 TABLET ORAL
Qty: 8 | Refills: 0 | Status: ACTIVE | COMMUNITY
Start: 2022-02-17

## 2022-02-23 NOTE — HISTORY OF PRESENT ILLNESS
[FreeTextEntry1] : F/u for multiple medical issues\par \par *** Feb 23, 2022 ***\par \par s/p  post total thyroidectomy with resection of substernal goiter and left inferior parathyroid autotransplantation on 02/16/2022.\par Path- adenomatoid nodules, lymphocytic thyroiditis\par \par d/c on L-thyroxine 125 mcg, calcium 1000 mg bid (starting today)\par still metformin er 500 mg 1 tab qd, Lipitor \par feels well, no dysphagia, no hoarseness, no paraesthesias\par \par *** May 13, 2021 ***\par \par feels fine.\par  on Metformin  mg w/ dinner, Lipitor 10 mg (1/2 tab qd), Acard 75 mg (equivalent to Plavix), Tapazole 2.5 mg qd, Indapen Dr 1.5 mg qd, Lexapro 10 mg, Valsarta 160 mg qd\par \par CT neck (5/4/21) + mass effect on trachea with the shift to the left. + tracheal compression in AP dimension, but overall is patent. + Substernal extension\par Thyr US (2/15/21)-  large MNG with the dominant RLP 3.8x2.0x2.2 (no calcs)\par saw Dr. Lewis- planned for total tx. Scheduled to see a CTX sx\par \par HISTORY OF PRESENT ILLNESS. \par \par Ms. FELICIANO was diagnosed with Diabetes Mellitus Type 2 in 2016. She reports history HTN, dyslipidemia, hyperthyroidism, MNG and denies CAD,  known complications of retinopathy, nephropathy, or neuropathy. She is presently on Metformin  mg w/ dinner, Lipitor 10 mg (1/2 tab qd), Acard 75 mg (equivalent to Plavix), Tapazole 2.5 mg 5 days a week, Indapen Dr 1.5 mg qd, Lexapro 10 mg. She is an immigrant from [place], residing in the area exposed to [place] fallout. She recalls having an uptake and scan showing "cold nodules" and an FNA done about 10 years ago, which was presumably benign. \par Blood sugars are checked 4 times a day. \par Log: fbs-105-146, p/B- , ac L- 92175, p/D- 95194, HS-  \par Hypoglycemia frequency: none\par Fingerstick glucose in the office today is 210 mg/dL 2 hours after eating. \par Diet: not following ADA\par Exercise: none\par \par Lab review: none\par \par Last dilated eye - 01/21\par Last podiatry visit  - none\par Last cardiology evaluation - several years ago\par Last stress test - does not recall\par Last 2-D Echo - several years ago\par Last nephrology evaluation - none\par Last neurology evaluation-none\par

## 2022-02-23 NOTE — ASSESSMENT
[FreeTextEntry1] : 1. T2DM\par - labs today\par - cont metformin, might need to switch to metformin/DPP4 combination\par - continue lipitor, monitor blood pressure , urine microalbumin\par - needs am PAC/PRA\par \par 2. H/o toxic MNG with substernal extension\par - L-thyroxine 125 mcg\par - monitor ca/D/PTH\par - post-op wound care reviewed\par \par RTC 6 weeks\par \par *** [phone] (daughter Perla)\par *** [phone] (cell)

## 2022-02-23 NOTE — PHYSICAL EXAM
[Midline] : located in midline position [Normal] : orientation to person, place, and time: normal [de-identified] : Extremities: HAUSER x 4.   Skin: No obvious skin lesions.   Voice: clear

## 2022-02-24 LAB
25(OH)D3 SERPL-MCNC: 26.4 NG/ML
ALBUMIN SERPL ELPH-MCNC: 4.4 G/DL
ALP BLD-CCNC: 87 U/L
ALT SERPL-CCNC: 13 U/L
ANION GAP SERPL CALC-SCNC: 11 MMOL/L
AST SERPL-CCNC: 16 U/L
BILIRUB SERPL-MCNC: 0.2 MG/DL
BUN SERPL-MCNC: 15 MG/DL
CALCIUM SERPL-MCNC: 8.8 MG/DL
CALCIUM SERPL-MCNC: 8.8 MG/DL
CHLORIDE SERPL-SCNC: 104 MMOL/L
CHOLEST SERPL-MCNC: 152 MG/DL
CO2 SERPL-SCNC: 27 MMOL/L
CREAT SERPL-MCNC: 0.77 MG/DL
CREAT SPEC-SCNC: 186 MG/DL
ESTIMATED AVERAGE GLUCOSE: 134 MG/DL
FOLATE SERPL-MCNC: 6.1 NG/ML
FRUCTOSAMINE SERPL-MCNC: 241 UMOL/L
GLUCOSE SERPL-MCNC: 129 MG/DL
HBA1C MFR BLD HPLC: 6.3 %
HDLC SERPL-MCNC: 53 MG/DL
LDLC SERPL CALC-MCNC: 61 MG/DL
MICROALBUMIN 24H UR DL<=1MG/L-MCNC: 5.9 MG/DL
MICROALBUMIN/CREAT 24H UR-RTO: 32 MG/G
NONHDLC SERPL-MCNC: 99 MG/DL
PARATHYROID HORMONE INTACT: 56 PG/ML
POTASSIUM SERPL-SCNC: 4.4 MMOL/L
PROT SERPL-MCNC: 6.6 G/DL
SODIUM SERPL-SCNC: 142 MMOL/L
T4 FREE SERPL-MCNC: 2.1 NG/DL
TRIGL SERPL-MCNC: 188 MG/DL
TSH SERPL-ACNC: 0.06 UIU/ML
VIT B12 SERPL-MCNC: 598 PG/ML

## 2022-03-15 RX ORDER — BLOOD SUGAR DIAGNOSTIC
STRIP MISCELLANEOUS
Qty: 1 | Refills: 3 | Status: ACTIVE | COMMUNITY
Start: 2022-03-15 | End: 1900-01-01

## 2022-04-12 ENCOUNTER — APPOINTMENT (OUTPATIENT)
Dept: ENDOCRINOLOGY | Facility: CLINIC | Age: 67
End: 2022-04-12
Payer: MEDICARE

## 2022-04-12 VITALS
OXYGEN SATURATION: 98 % | RESPIRATION RATE: 16 BRPM | SYSTOLIC BLOOD PRESSURE: 132 MMHG | HEART RATE: 70 BPM | WEIGHT: 173 LBS | BODY MASS INDEX: 32.24 KG/M2 | TEMPERATURE: 98 F | DIASTOLIC BLOOD PRESSURE: 80 MMHG | HEIGHT: 61.42 IN

## 2022-04-12 LAB — GLUCOSE BLDC GLUCOMTR-MCNC: 116

## 2022-04-12 PROCEDURE — 99214 OFFICE O/P EST MOD 30 MIN: CPT | Mod: 25

## 2022-04-12 PROCEDURE — 82962 GLUCOSE BLOOD TEST: CPT

## 2022-04-12 PROCEDURE — 36415 COLL VENOUS BLD VENIPUNCTURE: CPT

## 2022-04-12 NOTE — HISTORY OF PRESENT ILLNESS
[FreeTextEntry1] : F/u for multiple medical issues\par \par *** Apr 12, 2022 ***\par \par feels better. voice is till hoarse, but with some improvement\par saw Dr. Dean - residual right sided vocal fold weakness. voice retraining is suggested- pt is reluctant at present\par on  L-thyroxine 125 mcg, Metformin er 500 mg 1 tab qd\par not on ca/D\par \par *** Feb 23, 2022 ***\par \par s/p  post total thyroidectomy with resection of substernal goiter and left inferior parathyroid autotransplantation on 02/16/2022.\par Path- adenomatoid nodules, lymphocytic thyroiditis\par \par d/c on L-thyroxine 125 mcg, calcium 1000 mg bid (starting today)\par still metformin er 500 mg 1 tab qd, Lipitor \par feels well, no dysphagia, no hoarseness, no paraesthesias\par \par *** May 13, 2021 ***\par \par feels fine.\par  on Metformin  mg w/ dinner, Lipitor 10 mg (1/2 tab qd), Acard 75 mg (equivalent to Plavix), Tapazole 2.5 mg qd, Amien  1.5 mg qd, Lexapro 10 mg, Valsarta 160 mg qd\par \par CT neck (5/4/21) + mass effect on trachea with the shift to the left. + tracheal compression in AP dimension, but overall is patent. + Substernal extension\par Thyr US (2/15/21)-  large MNG with the dominant RLP 3.8x2.0x2.2 (no calcs)\par saw Dr. Lewis- planned for total tx. Scheduled to see a CTX sx\par \par HISTORY OF PRESENT ILLNESS. \par \par Ms. FELICIANO was diagnosed with Diabetes Mellitus Type 2 in 2016. She reports history HTN, dyslipidemia, hyperthyroidism, MNG and denies CAD,  known complications of retinopathy, nephropathy, or neuropathy. She is presently on Metformin  mg w/ dinner, Lipitor 10 mg (1/2 tab qd), Acard 75 mg (equivalent to Plavix), Tapazole 2.5 mg 5 days a week, Indapen Dr 1.5 mg qd, Lexapro 10 mg. She is an immigrant from [place], residing in the area exposed to [place] fallout. She recalls having an uptake and scan showing "cold nodules" and an FNA done about 10 years ago, which was presumably benign. \par Blood sugars are checked 4 times a day. \par Log: fbs-105-146, p/B- , ac L- , p/D- , HS-  \par Hypoglycemia frequency: none\par Fingerstick glucose in the office today is 210 mg/dL 2 hours after eating. \par Diet: not following ADA\par Exercise: none\par \par Lab review: none\par \par Last dilated eye - 01/21\par Last podiatry visit  - none\par Last cardiology evaluation - several years ago\par Last stress test - does not recall\par Last 2-D Echo - several years ago\par Last nephrology evaluation - none\par Last neurology evaluation-none\par

## 2022-04-12 NOTE — ASSESSMENT
[FreeTextEntry1] : 1. T2DM\par - cont metformin, if requires will uptitrate vs switch to metformin/DPP4 combination\par - continue lipitor, monitor blood pressure , urine microalbumin\par \par 2. H/o toxic MNG with substernal extension. S/p total thyroidectomy\par - L-thyroxine 125 mcg\par - monitor ca/D/PTH\par - post-op wound care reviewed\par - f/u with Dr. Lewis in August and Dr. Dean as scheduled\par \par 3. HTN\par - check am PAC/PRA\par \par \par RTC 3 months\par \par *** [phone] (daughter Perla)\par *** [phone] (cell)

## 2022-04-13 LAB
25(OH)D3 SERPL-MCNC: 27.6 NG/ML
ALDOSTERONE SERUM: 9.2 NG/DL
ANION GAP SERPL CALC-SCNC: 12 MMOL/L
BUN SERPL-MCNC: 12 MG/DL
CALCIUM SERPL-MCNC: 9.4 MG/DL
CALCIUM SERPL-MCNC: 9.4 MG/DL
CHLORIDE SERPL-SCNC: 104 MMOL/L
CO2 SERPL-SCNC: 27 MMOL/L
CREAT SERPL-MCNC: 0.6 MG/DL
EGFR: 99 ML/MIN/1.73M2
GLUCOSE SERPL-MCNC: 124 MG/DL
PARATHYROID HORMONE INTACT: 36 PG/ML
POTASSIUM SERPL-SCNC: 4 MMOL/L
SODIUM SERPL-SCNC: 143 MMOL/L
T4 FREE SERPL-MCNC: 2.5 NG/DL
TSH SERPL-ACNC: 0.01 UIU/ML

## 2022-04-24 LAB — RENIN ACTIVITY, PLASMA: 1.28 NG/ML/HR

## 2022-06-07 ENCOUNTER — RX RENEWAL (OUTPATIENT)
Age: 67
End: 2022-06-07

## 2022-08-09 ENCOUNTER — APPOINTMENT (OUTPATIENT)
Dept: ENDOCRINOLOGY | Facility: CLINIC | Age: 67
End: 2022-08-09

## 2022-09-07 ENCOUNTER — RX RENEWAL (OUTPATIENT)
Age: 67
End: 2022-09-07

## 2023-01-25 ENCOUNTER — APPOINTMENT (OUTPATIENT)
Dept: ENDOCRINOLOGY | Facility: CLINIC | Age: 68
End: 2023-01-25
Payer: MEDICARE

## 2023-01-25 VITALS
DIASTOLIC BLOOD PRESSURE: 70 MMHG | HEIGHT: 61.42 IN | OXYGEN SATURATION: 98 % | SYSTOLIC BLOOD PRESSURE: 118 MMHG | HEART RATE: 74 BPM | BODY MASS INDEX: 33.92 KG/M2 | RESPIRATION RATE: 16 BRPM | WEIGHT: 182 LBS | TEMPERATURE: 97.3 F

## 2023-01-25 DIAGNOSIS — E89.0 POSTPROCEDURAL HYPOTHYROIDISM: ICD-10-CM

## 2023-01-25 DIAGNOSIS — E11.9 TYPE 2 DIABETES MELLITUS W/OUT COMPLICATIONS: ICD-10-CM

## 2023-01-25 DIAGNOSIS — I10 ESSENTIAL (PRIMARY) HYPERTENSION: ICD-10-CM

## 2023-01-25 DIAGNOSIS — E78.5 HYPERLIPIDEMIA, UNSPECIFIED: ICD-10-CM

## 2023-01-25 LAB — GLUCOSE BLDC GLUCOMTR-MCNC: 153

## 2023-01-25 PROCEDURE — 82962 GLUCOSE BLOOD TEST: CPT

## 2023-01-25 PROCEDURE — 36415 COLL VENOUS BLD VENIPUNCTURE: CPT

## 2023-01-25 PROCEDURE — 99214 OFFICE O/P EST MOD 30 MIN: CPT | Mod: 25

## 2023-01-25 RX ORDER — LEVOTHYROXINE SODIUM 0.1 MG/1
100 TABLET ORAL DAILY
Qty: 90 | Refills: 2 | Status: DISCONTINUED | COMMUNITY
Start: 2022-02-17 | End: 2023-01-25

## 2023-01-25 RX ORDER — LEVOTHYROXINE SODIUM 0.12 MG/1
125 TABLET ORAL
Qty: 90 | Refills: 2 | Status: DISCONTINUED | COMMUNITY
Start: 2022-06-07 | End: 2023-01-25

## 2023-01-25 RX ORDER — SEMAGLUTIDE 1.34 MG/ML
2 INJECTION, SOLUTION SUBCUTANEOUS
Qty: 1 | Refills: 6 | Status: ACTIVE | COMMUNITY
Start: 2023-01-25 | End: 1900-01-01

## 2023-01-25 NOTE — HISTORY OF PRESENT ILLNESS
[FreeTextEntry1] : F/u for multiple medical issues\par \par *** Jan 25, 2023 ***\par \par feels well, but is very concerned with a weight loss\par hoarseness resolved\par spent past several months in [place]. dose of L-thyroxine 125 mcg changed- presently is on 88 mcg 5.5/wk, Metformin er 500 mg 1 tab qd\par no recent labs\par prior PAC/PRA- 9.2/1.285\par \par *** Apr 12, 2022 ***\par \par feels better. voice is till hoarse, but with some improvement\par saw Dr. Dean - residual right sided vocal fold weakness. voice retraining is suggested- pt is reluctant at present\par on  L-thyroxine 125 mcg, Metformin er 500 mg 1 tab qd\par not on ca/D\par \par *** Feb 23, 2022 ***\par \par s/p  post total thyroidectomy with resection of substernal goiter and left inferior parathyroid autotransplantation on 02/16/2022.\par Path- adenomatoid nodules, lymphocytic thyroiditis\par \par d/c on L-thyroxine 125 mcg, calcium 1000 mg bid (starting today)\par still metformin er 500 mg 1 tab qd, Lipitor \par feels well, no dysphagia, no hoarseness, no paraesthesias\par \par *** May 13, 2021 ***\par \par feels fine.\par  on Metformin  mg w/ dinner, Lipitor 10 mg (1/2 tab qd), Acard 75 mg (equivalent to Plavix), Tapazole 2.5 mg qd, Indapen Dr 1.5 mg qd, Lexapro 10 mg, Valsarta 160 mg qd\par \par CT neck (5/4/21) + mass effect on trachea with the shift to the left. + tracheal compression in AP dimension, but overall is patent. + Substernal extension\par Thyr US (2/15/21)-  large MNG with the dominant RLP 3.8x2.0x2.2 (no calcs)\par saw Dr. Lewis- planned for total tx. Scheduled to see a CTX sx\par \par HISTORY OF PRESENT ILLNESS. \par \par Ms. FELICIANO was diagnosed with Diabetes Mellitus Type 2 in 2016. She reports history HTN, dyslipidemia, hyperthyroidism, MNG and denies CAD,  known complications of retinopathy, nephropathy, or neuropathy. She is presently on Metformin  mg w/ dinner, Lipitor 10 mg (1/2 tab qd), Acard 75 mg (equivalent to Plavix), Tapazole 2.5 mg 5 days a week, Indapen Dr 1.5 mg qd, Lexapro 10 mg. She is an immigrant from [place], residing in the area exposed to [place] fallout. She recalls having an uptake and scan showing "cold nodules" and an FNA done about 10 years ago, which was presumably benign. \par Blood sugars are checked 4 times a day. \par Log: fbs-105-146, p/B- , ac L- , p/D- , HS-  \par Hypoglycemia frequency: none\par Fingerstick glucose in the office today is 210 mg/dL 2 hours after eating. \par Diet: not following ADA\par Exercise: none\par \par Lab review: none\par \par Last dilated eye - 01/21\par Last podiatry visit  - none\par Last cardiology evaluation - several years ago\par Last stress test - does not recall\par Last 2-D Echo - several years ago\par Last nephrology evaluation - none\par Last neurology evaluation-none\par

## 2023-01-25 NOTE — ASSESSMENT
[Diabetes Foot Care] : diabetes foot care [Long Term Vascular Complications] : long term vascular complications of diabetes [Carbohydrate Consistent Diet] : carbohydrate consistent diet [Importance of Diet and Exercise] : importance of diet and exercise to improve glycemic control, achieve weight loss and improve cardiovascular health [Exercise/Effect on Glucose] : exercise/effect on glucose [Hypoglycemia Management] : hypoglycemia management [Glucagon Use] : glucagon use [Self Monitoring of Blood Glucose] : self monitoring of blood glucose [Injection Technique, Storage, Sharps Disposal] : injection technique, storage, and sharps disposal [Retinopathy Screening] : Patient was referred to ophthalmology for retinopathy screening [Diabetic Medications] : Risks and benefits of diabetic medications were discussed [FreeTextEntry1] : 1. T2DM\par - cont metformin 500 mg qd\par - we discussed GLP1RA, trial of ozempic 0.25 mg qw and uptitrate (R+B)\par - continue lipitor, monitor blood pressure , urine microalbumin\par \par 2. H/o toxic MNG with substernal extension. S/p total thyroidectomy\par - L-thyroxine 88 mcg 5.5/wk, pending labs\par - monitor ca/D/PTH\par - post-op wound care reviewed\par - f/u with Dr. Lewis in August and Dr. Dean as scheduled\par \par 3. HTN\par - cont present regimen\par \par \par RTC 3 months\par \par *** [phone] (daughter Perla)\par *** [phone] (cell)

## 2023-01-26 ENCOUNTER — NON-APPOINTMENT (OUTPATIENT)
Age: 68
End: 2023-01-26

## 2023-01-26 LAB
ALBUMIN SERPL ELPH-MCNC: 4.6 G/DL
ALP BLD-CCNC: 68 U/L
ALT SERPL-CCNC: 17 U/L
ANION GAP SERPL CALC-SCNC: 14 MMOL/L
AST SERPL-CCNC: 17 U/L
BILIRUB SERPL-MCNC: 0.5 MG/DL
BUN SERPL-MCNC: 14 MG/DL
CALCIUM SERPL-MCNC: 9.5 MG/DL
CHLORIDE SERPL-SCNC: 103 MMOL/L
CHOLEST SERPL-MCNC: 192 MG/DL
CO2 SERPL-SCNC: 26 MMOL/L
CREAT SERPL-MCNC: 0.75 MG/DL
CREAT SPEC-SCNC: 321 MG/DL
EGFR: 87 ML/MIN/1.73M2
ESTIMATED AVERAGE GLUCOSE: 137 MG/DL
FOLATE SERPL-MCNC: 16 NG/ML
FRUCTOSAMINE SERPL-MCNC: 256 UMOL/L
GLUCOSE SERPL-MCNC: 113 MG/DL
HBA1C MFR BLD HPLC: 6.4 %
HDLC SERPL-MCNC: 58 MG/DL
LDLC SERPL CALC-MCNC: 112 MG/DL
MICROALBUMIN 24H UR DL<=1MG/L-MCNC: 2.7 MG/DL
MICROALBUMIN/CREAT 24H UR-RTO: 8 MG/G
NONHDLC SERPL-MCNC: 133 MG/DL
POTASSIUM SERPL-SCNC: 4.3 MMOL/L
PROT SERPL-MCNC: 6.9 G/DL
SODIUM SERPL-SCNC: 142 MMOL/L
T4 FREE SERPL-MCNC: 1.4 NG/DL
TRIGL SERPL-MCNC: 105 MG/DL
TSH SERPL-ACNC: 4.42 UIU/ML
VIT B12 SERPL-MCNC: 495 PG/ML

## 2023-01-26 RX ORDER — BLOOD SUGAR DIAGNOSTIC
STRIP MISCELLANEOUS
Qty: 300 | Refills: 2 | Status: ACTIVE | COMMUNITY
Start: 2023-01-26 | End: 1900-01-01

## 2023-01-26 RX ORDER — LANCETS 28 GAUGE
EACH MISCELLANEOUS
Qty: 300 | Refills: 11 | Status: ACTIVE | COMMUNITY
Start: 2023-01-26 | End: 1900-01-01

## 2023-02-07 RX ORDER — LEVOTHYROXINE SODIUM 0.09 MG/1
88 TABLET ORAL DAILY
Qty: 90 | Refills: 2 | Status: ACTIVE | COMMUNITY
Start: 2023-01-26 | End: 1900-01-01

## 2023-05-03 RX ORDER — ATORVASTATIN CALCIUM 20 MG/1
20 TABLET, FILM COATED ORAL
Qty: 1 | Refills: 1 | Status: ACTIVE | COMMUNITY
Start: 1900-01-01 | End: 1900-01-01

## 2023-05-16 ENCOUNTER — APPOINTMENT (OUTPATIENT)
Dept: ENDOCRINOLOGY | Facility: CLINIC | Age: 68
End: 2023-05-16

## 2023-06-26 ENCOUNTER — RX RENEWAL (OUTPATIENT)
Age: 68
End: 2023-06-26

## 2023-11-06 ENCOUNTER — APPOINTMENT (OUTPATIENT)
Dept: ENDOCRINOLOGY | Facility: CLINIC | Age: 68
End: 2023-11-06

## 2023-12-21 ENCOUNTER — APPOINTMENT (OUTPATIENT)
Dept: ENDOCRINOLOGY | Facility: CLINIC | Age: 68
End: 2023-12-21

## 2023-12-30 ENCOUNTER — NON-APPOINTMENT (OUTPATIENT)
Age: 68
End: 2023-12-30

## 2025-06-19 NOTE — PATIENT PROFILE ADULT - SURGICAL SITE DESCRIPTION
DISPLAY PLAN FREE TEXT DISPLAY PLAN FREE TEXT DISPLAY PLAN FREE TEXT DISPLAY PLAN FREE TEXT DISPLAY PLAN FREE TEXT DISPLAY PLAN FREE TEXT DISPLAY PLAN FREE TEXT DISPLAY PLAN FREE TEXT DISPLAY PLAN FREE TEXT anterior neck

## (undated) DEVICE — ELCTR BOVIE TIP BLADE INSULATED 6.5" EDGE

## (undated) DEVICE — CHEST DRAIN OASIS DRY SUCTION WATER SEAL

## (undated) DEVICE — SUT STAINLESS STEEL 5-0 18"  V-40

## (undated) DEVICE — SUT SILK 3-0 30" SH

## (undated) DEVICE — PACK MAJOR ABDOMINAL WITH LAP

## (undated) DEVICE — POSITIONER FOAM HEAD CRADLE (PINK)

## (undated) DEVICE — FOLEY TRAY 16FR 5CC LF UMETER CLOSED

## (undated) DEVICE — SUT MONOCRYL 4-0 18" PS-2

## (undated) DEVICE — DRSG BENZOIN 0.6CC

## (undated) DEVICE — CANISTER DISPOSABLE THIN WALL 3000CC

## (undated) DEVICE — DRSG STERISTRIPS 0.5 X 4"

## (undated) DEVICE — DRAPE TOWEL BLUE 17" X 24"

## (undated) DEVICE — SUT VICRYL PLUS 2-0 18" CT-1 (POP-OFF)

## (undated) DEVICE — DRAIN BLAKE 10FR ROUND

## (undated) DEVICE — DRAPE IOBAN 23" X 23"

## (undated) DEVICE — DRSG TELFA 3 X 8

## (undated) DEVICE — DRAPE 3/4 SHEET 52X76"

## (undated) DEVICE — SUT VICRYL 3-0 27" SH UNDYED

## (undated) DEVICE — TUBING SUCTION NONCONDUCTIVE 6MM X 12FT

## (undated) DEVICE — WARMING BLANKET LOWER ADULT

## (undated) DEVICE — ELCTR GROUNDING PAD ADULT COVIDIEN

## (undated) DEVICE — DRAPE LARGE SHEET 72X85"

## (undated) DEVICE — ELCTR BOVIE BLADE 3/4" EXTENDED LENGTH 6"

## (undated) DEVICE — SUT SILK 2-0 30" SH

## (undated) DEVICE — Device

## (undated) DEVICE — SUT PDS II 1 96" XLH

## (undated) DEVICE — SUT SILK 3-0 18" TIES

## (undated) DEVICE — SUT MONOCRYL 4-0 27" PS-2 UNDYED

## (undated) DEVICE — DRAPE GENERAL ENDOSCOPY

## (undated) DEVICE — PACK HEAD & NECK

## (undated) DEVICE — SOL IRR POUR H2O 500ML

## (undated) DEVICE — LIJ-LERUTE VIDEO MEDIASTINOSCOPY TRAY: Type: DURABLE MEDICAL EQUIPMENT

## (undated) DEVICE — VENODYNE/SCD SLEEVE CALF MEDIUM

## (undated) DEVICE — LABELS BLANK W PEN

## (undated) DEVICE — DRSG TEGADERM 4X4.75"

## (undated) DEVICE — DRAIN RESERVOIR FOR JACKSON PRATT 100CC CARDINAL

## (undated) DEVICE — SUT VICRYL 1 36" CTX UNDYED

## (undated) DEVICE — POSITIONER STRAP ARMBOARD VELCRO TS-30

## (undated) DEVICE — STAPLER SKIN VISI-STAT 35 WIDE

## (undated) DEVICE — PREP CHLORAPREP HI-LITE ORANGE 26ML

## (undated) DEVICE — SUT ETHILON 2-0 18" FS

## (undated) DEVICE — SAW BLADE MICROAIRE STERNUM 1X34X9.4MM

## (undated) DEVICE — DRSG CURITY GAUZE SPONGE 4 X 4" 12-PLY

## (undated) DEVICE — DRSG MASTISOL

## (undated) DEVICE — DRAPE THYROID 77" X 123"

## (undated) DEVICE — DRAPE INSTRUMENT POUCH 6.75" X 11"

## (undated) DEVICE — LIGASURE SMALL JAW

## (undated) DEVICE — DRAPE MAGNETIC INSTRUMENT MEDIUM